# Patient Record
Sex: MALE | Race: WHITE | Employment: OTHER | URBAN - METROPOLITAN AREA
[De-identification: names, ages, dates, MRNs, and addresses within clinical notes are randomized per-mention and may not be internally consistent; named-entity substitution may affect disease eponyms.]

---

## 2023-12-12 ENCOUNTER — HOSPITAL ENCOUNTER (OUTPATIENT)
Facility: HOSPITAL | Age: 83
Discharge: HOME | End: 2023-12-15

## 2023-12-12 VITALS
SYSTOLIC BLOOD PRESSURE: 139 MMHG | WEIGHT: 194 LBS | RESPIRATION RATE: 16 BRPM | HEART RATE: 68 BPM | HEIGHT: 70 IN | DIASTOLIC BLOOD PRESSURE: 79 MMHG | BODY MASS INDEX: 27.77 KG/M2

## 2023-12-12 DIAGNOSIS — R97.21 INCREASING PSA LEVEL AFTER TREATMENT FOR PROSTATE CANCER: ICD-10-CM

## 2023-12-12 DIAGNOSIS — C61 PROSTATE CANCER (HCC): ICD-10-CM

## 2023-12-12 RX ORDER — OLMESARTAN MEDOXOMIL 40 MG/1
40 TABLET ORAL DAILY
COMMUNITY

## 2023-12-12 RX ORDER — ATORVASTATIN CALCIUM 40 MG/1
1 TABLET, FILM COATED ORAL DAILY
COMMUNITY
Start: 2016-12-02

## 2023-12-12 RX ORDER — HYDROCHLOROTHIAZIDE 25 MG/1
25 TABLET ORAL DAILY
COMMUNITY

## 2023-12-12 ASSESSMENT — PAIN SCALES - GENERAL: PAINLEVEL_OUTOF10: 0

## 2023-12-12 NOTE — CONSULTS
treatment vs observation with interval PSA labs. He will see Dr. Andrei Rain before deciding, but is leaning towards observation. We did discuss that treatment at this time would bring a certainty of side effects, expense, and use of his time, while given his low PSA and slow PSA doubling time, it's not clear to me that his prostate cancer will necessarily affect his quality of life during his remaining lifespan, even if untreated. We did discuss the uncertainty that comes when trying to predict life expectancy. We discussed the nature and rationale of radiotherapy for the treatment of prostate cancer. We reviewed the radiotherapy side effects that can occur during treatment and within a few months after completing treatment. These include but are not limited to mild fatigue, bladder and urethra irritation symptoms of frequency and urgency, mild burning with urination, a small risk of tiny amounts of bleeding in the urine, and the rectum and bowel irritation symptoms of slightly looser, urgent and more frequent bowel movements, and possible small amounts of bleeding from hemorrhoids. These side effects are generally mild, are easily managed during treatment, and gradually resolve within several weeks after completion of treatment. We discussed that there is a very small risk that some side effects can persist for many months but that they resolve in the vast majority of patients. He already has erectile dysfunction and as we discussed, this treatment would not improve that. We discussed that the risk of urinary incontinence or bowel incontinence are extremely rare, if not present already. Lastly, we reviewed the likelihood of treatment success with radiotherapy, as well as the potential alternative treatments and the consequences of not proceeding with any treatment.     We discussed that conventionally fractionated post prostatectomy radiation is delivered over an approximately 8-week course of daily (M - F)